# Patient Record
Sex: FEMALE | Race: WHITE | NOT HISPANIC OR LATINO | URBAN - METROPOLITAN AREA
[De-identification: names, ages, dates, MRNs, and addresses within clinical notes are randomized per-mention and may not be internally consistent; named-entity substitution may affect disease eponyms.]

---

## 2020-07-25 ENCOUNTER — TELEPHONE ENCOUNTER (OUTPATIENT)
Dept: URBAN - METROPOLITAN AREA CLINIC 13 | Facility: CLINIC | Age: 70
End: 2020-07-25

## 2020-07-25 RX ORDER — CHOLESTYRAMINE 4 G/9G
DISSOLVE CONTENTS OF ONE PACKET IN WATER OR JUICE AND DRINK DAILY POWDER, FOR SUSPENSION ORAL
Qty: 15 | Refills: 0 | OUTPATIENT
Start: 2012-09-21 | End: 2016-11-08

## 2020-07-25 RX ORDER — ALBUTEROL SULFATE 90 UG/1
INHALE 1 TO 2 PUFFS EVERY 4 TO 6 HOURS AS NEEDED AEROSOL, METERED RESPIRATORY (INHALATION)
Refills: 0 | OUTPATIENT
Start: 2020-02-18 | End: 2020-03-26

## 2020-07-25 RX ORDER — FLUTICASONE FUROATE AND VILANTEROL TRIFENATATE 100; 25 UG/1; UG/1
ONE INHALATION DAILY. AFTER INHALATION RINSE MOUTH WITH WATER & SPIT.USE SAME TIME EACH DAY, NO MORE THAN 1 TIME IN 24 HOURS POWDER RESPIRATORY (INHALATION)
Refills: 0 | OUTPATIENT
Start: 2020-02-18 | End: 2020-03-26

## 2020-07-25 RX ORDER — ACYCLOVIR 400 MG/1
TAKE 1 TABLET 4 TIMES DAILY PRN TABLET ORAL
Refills: 0 | OUTPATIENT
End: 2020-02-21

## 2020-07-25 RX ORDER — FLUCONAZOLE 100 MG/1
TAKE 1 TABLET DAILY TABLET ORAL
Qty: 3 | Refills: 0 | OUTPATIENT
Start: 2012-06-15 | End: 2012-09-04

## 2020-07-25 RX ORDER — HYOSCYAMINE SULFATE 0.12 MG/1
PLACE 1 TABLET UNDER THE TONGUE 4 TIMES DAILY AS NEEDED FOR FREQUENCY OR DIARRHEA TABLET, ORALLY DISINTEGRATING ORAL
Refills: 0 | OUTPATIENT
End: 2016-11-08

## 2020-07-25 RX ORDER — CHOLECALCIFEROL (VITAMIN D3) 50 MCG
TAKE 1 TABLET DAILY PT STATES UNKNOWN DOSE TABLET ORAL
Refills: 0 | OUTPATIENT
End: 2016-11-08

## 2020-07-25 RX ORDER — IBANDRONATE SODIUM 150 MG
TAKE AS DIRECTED TABLET ORAL
Refills: 0 | OUTPATIENT
Start: 2009-12-11 | End: 2010-11-05

## 2020-07-25 RX ORDER — CALCIUM CITRATE/VITAMIN D3 315MG-6.25
TAKE 1 TABLET TWICE DAILY TABLET ORAL
Refills: 0 | OUTPATIENT
Start: 2008-05-21 | End: 2009-12-11

## 2020-07-25 RX ORDER — METRONIDAZOLE 500 MG/1
TAKE 1 CAPSULE 3 TIMES DAILY TABLET ORAL
Qty: 21 | Refills: 0 | OUTPATIENT
Start: 2012-06-15 | End: 2012-09-04

## 2020-07-25 RX ORDER — MONTELUKAST SODIUM 10 MG/1
TAKE 1 TABLET DAILY AS DIRECTED TABLET, FILM COATED ORAL
Refills: 0 | OUTPATIENT
Start: 2020-02-18 | End: 2020-03-26

## 2020-07-25 RX ORDER — METRONIDAZOLE 500 MG/1
TAKE 1 TABLET EVERY 6 HOURS DAILY TABLET, FILM COATED ORAL
Refills: 0 | OUTPATIENT
End: 2012-09-04

## 2020-07-25 RX ORDER — NITAZOXANIDE 500 MG/1
TAKE 1 TABLET TWICE DAILY FOR 14 DAYS TABLET ORAL
Qty: 20 | Refills: 0 | OUTPATIENT
Start: 2012-09-04 | End: 2012-09-14

## 2020-07-25 RX ORDER — IBUPROFEN 400 MG/1
TAKE 1 TABLET 3 TIMES DAILY AS NEEDED TABLET, FILM COATED ORAL
Refills: 0 | OUTPATIENT
End: 2020-02-21

## 2020-07-26 ENCOUNTER — TELEPHONE ENCOUNTER (OUTPATIENT)
Dept: URBAN - METROPOLITAN AREA CLINIC 13 | Facility: CLINIC | Age: 70
End: 2020-07-26

## 2020-07-26 RX ORDER — NAPROXEN 500 MG/1
TAKE ONE TABLET BY MOUTH TWICE DAILY WITH MEALS TABLET ORAL
Qty: 60 | Refills: 0 | Status: ACTIVE | COMMUNITY
Start: 2019-07-31

## 2020-07-26 RX ORDER — BETAMETHASONE DIPROPIONATE 0.5 MG/ML
LOTION, AUGMENTED TOPICAL
Qty: 60 | Refills: 0 | Status: ACTIVE | COMMUNITY
Start: 2011-10-10

## 2020-07-26 RX ORDER — HYDROCODONE BITARTRATE AND ACETAMINOPHEN 5; 325 MG/1; MG/1
TAKE ONE TABLET BY MOUTH EVERY 6 HOURS AS NEEDED TABLET ORAL
Qty: 30 | Refills: 0 | Status: ACTIVE | COMMUNITY
Start: 2019-07-31

## 2020-07-26 RX ORDER — VALACYCLOVIR HYDROCHLORIDE 500 MG/1
TAKE 1 TABLET DAILY TABLET, FILM COATED ORAL
Refills: 0 | Status: ACTIVE | COMMUNITY
Start: 2019-11-20

## 2020-07-26 RX ORDER — HYOSCYAMINE SULFATE 0.12 MG/1
TABLET ORAL
Qty: 20 | Refills: 0 | Status: ACTIVE | COMMUNITY
Start: 2012-08-22

## 2020-07-26 RX ORDER — ACYCLOVIR 200 MG/1
CAPSULE ORAL
Qty: 30 | Refills: 0 | Status: ACTIVE | COMMUNITY
Start: 2011-04-01

## 2020-07-26 RX ORDER — ESCITALOPRAM 10 MG/1
TAKE ONE TABLET BY MOUTH AT BEDTIME TABLET, FILM COATED ORAL
Qty: 30 | Refills: 0 | Status: ACTIVE | COMMUNITY
Start: 2019-05-29

## 2021-10-08 ENCOUNTER — OFFICE VISIT (OUTPATIENT)
Dept: URBAN - METROPOLITAN AREA CLINIC 113 | Facility: CLINIC | Age: 71
End: 2021-10-08
Payer: MEDICARE

## 2021-10-08 ENCOUNTER — WEB ENCOUNTER (OUTPATIENT)
Dept: URBAN - METROPOLITAN AREA CLINIC 113 | Facility: CLINIC | Age: 71
End: 2021-10-08

## 2021-10-08 VITALS
WEIGHT: 128 LBS | HEART RATE: 83 BPM | TEMPERATURE: 97.3 F | HEIGHT: 62 IN | RESPIRATION RATE: 18 BRPM | SYSTOLIC BLOOD PRESSURE: 128 MMHG | BODY MASS INDEX: 23.55 KG/M2 | DIASTOLIC BLOOD PRESSURE: 79 MMHG

## 2021-10-08 DIAGNOSIS — K21.9 GASTROESOPHAGEAL REFLUX DISEASE, UNSPECIFIED WHETHER ESOPHAGITIS PRESENT: ICD-10-CM

## 2021-10-08 DIAGNOSIS — K59.09 CHRONIC CONSTIPATION: ICD-10-CM

## 2021-10-08 DIAGNOSIS — R10.12 LUQ ABDOMINAL PAIN: ICD-10-CM

## 2021-10-08 PROCEDURE — 99204 OFFICE O/P NEW MOD 45 MIN: CPT | Performed by: PHYSICIAN ASSISTANT

## 2021-10-08 RX ORDER — VALACYCLOVIR HYDROCHLORIDE 500 MG/1
TAKE 1 TABLET DAILY TABLET, FILM COATED ORAL
Refills: 0 | Status: ACTIVE | COMMUNITY
Start: 2019-11-20

## 2021-10-08 RX ORDER — ACYCLOVIR 200 MG/1
CAPSULE ORAL
Qty: 30 | Refills: 0 | Status: DISCONTINUED | COMMUNITY
Start: 2011-04-01

## 2021-10-08 RX ORDER — CHOLECALCIFEROL (VITAMIN D3) 50 MCG
1 TABLET TABLET ORAL ONCE A DAY
Status: ACTIVE | COMMUNITY

## 2021-10-08 RX ORDER — ESCITALOPRAM 10 MG/1
TAKE ONE TABLET BY MOUTH AT BEDTIME TABLET, FILM COATED ORAL
Qty: 30 | Refills: 0 | Status: DISCONTINUED | COMMUNITY
Start: 2019-05-29

## 2021-10-08 RX ORDER — ALBUTEROL SULFATE 90 UG/1
1 PUFF AS NEEDED AEROSOL, METERED RESPIRATORY (INHALATION)
Status: ACTIVE | COMMUNITY

## 2021-10-08 RX ORDER — BIOTIN 5 MG
1 CAPSULE CAPSULE ORAL ONCE A DAY
Status: ACTIVE | COMMUNITY

## 2021-10-08 RX ORDER — HYOSCYAMINE SULFATE 0.12 MG/1
TABLET ORAL
Qty: 20 | Refills: 0 | Status: DISCONTINUED | COMMUNITY
Start: 2012-08-22

## 2021-10-08 RX ORDER — NAPROXEN 500 MG/1
TAKE ONE TABLET BY MOUTH TWICE DAILY WITH MEALS TABLET ORAL
Qty: 60 | Refills: 0 | Status: DISCONTINUED | COMMUNITY
Start: 2019-07-31

## 2021-10-08 RX ORDER — ZINC 25 MG
1 TABLET TABLET ORAL ONCE A DAY
Status: ACTIVE | COMMUNITY

## 2021-10-08 RX ORDER — BETAMETHASONE DIPROPIONATE 0.5 MG/ML
LOTION, AUGMENTED TOPICAL
Qty: 60 | Refills: 0 | Status: DISCONTINUED | COMMUNITY
Start: 2011-10-10

## 2021-10-08 RX ORDER — LEVOTHYROXINE SODIUM 100 UG/1
1 TABLET IN THE MORNING ON AN EMPTY STOMACH TABLET ORAL ONCE A DAY
Status: ACTIVE | COMMUNITY

## 2021-10-08 RX ORDER — HYDROCODONE BITARTRATE AND ACETAMINOPHEN 5; 325 MG/1; MG/1
TAKE ONE TABLET BY MOUTH EVERY 6 HOURS AS NEEDED TABLET ORAL
Qty: 30 | Refills: 0 | Status: DISCONTINUED | COMMUNITY
Start: 2019-07-31

## 2021-10-08 NOTE — HPI-TODAY'S VISIT:
Ms. Ryan is a 71-year-old woman with a history of pseudomembranous colitis in 2012 responsive to vancomycin and Questran, hypertension, and hypothyroidism, referred by Dr. Lora, presenting with stated complaints of stomach pain.  A copy of this document will be sent to referring provider.  She was previously followed by Dr. Juan for nuemrous GI complaints including chronic abdominal pain, bloating and intermittent nonbloody diarrhea.  She has been seen by Dr. Lmoeli as well.  Her last colonoscopy performed on 1/3/2017 was notable for diverticulosis in the sigmoid colon, in the descending colon, at the splenic flexure and in the transverse colon.  Nonbleeding internal hemorrhoids.  She is due for surveillance colonoscopy in 2027.  Her last EGD was noted to have been performed on 9/14/2012 which was unremarkable.  She was last seen on 3/26/2020 for follow-up regarding abdominal pain.  Her symptoms were noted to be doing well at that time.  She was instructed to continue MiraLAX daily for exacerbations of constipation.  She was also instructed to avoid dairy products. Her main complaint today appears to the left upper quadrant abdominal discomfort.  She describes the pain as a sharp stabbing pain that occasionally can take her breath away.  Her pain is unrelated to meals or defecation.  She does not take NSAIDs in excess.  There is no associated nausea or vomiting.  She denies any alleviating or exacerbating factors.  She believes it may be related to chronic upper back pain.  She is followed by  for this.  She also complains of  dysphagia described as a sensation of a lump in her throat.  She states that this is a chronic problem.  Denies regurgitation, odynophagia or unintentional weight loss.  Denies early satiety.  Regarding her bowel habits, she states that she is having regular bowel movements that are normal in consitency. No red blood per rectum or melena. She also reports increased fatigue which she noticed after recived her seconda COVID vaccine.  She deneis any changes in her past medical history of surgical hsitory since her last office visit.

## 2021-10-08 NOTE — PHYSICAL EXAM GASTROINTESTINAL
Abdomen,  soft, mild LUQ tenderness with palpation, nondistended,  no guarding or rigidity,  no masses palpable,  normal bowel sounds

## 2021-10-17 ENCOUNTER — TELEPHONE ENCOUNTER (OUTPATIENT)
Dept: URBAN - METROPOLITAN AREA CLINIC 113 | Facility: CLINIC | Age: 71
End: 2021-10-17

## 2021-11-22 ENCOUNTER — OFFICE VISIT (OUTPATIENT)
Dept: URBAN - METROPOLITAN AREA CLINIC 113 | Facility: CLINIC | Age: 71
End: 2021-11-22
Payer: MEDICARE

## 2021-11-22 VITALS
DIASTOLIC BLOOD PRESSURE: 81 MMHG | BODY MASS INDEX: 23 KG/M2 | SYSTOLIC BLOOD PRESSURE: 130 MMHG | HEIGHT: 62 IN | TEMPERATURE: 98.2 F | WEIGHT: 125 LBS | HEART RATE: 85 BPM

## 2021-11-22 DIAGNOSIS — R10.12 LUQ ABDOMINAL PAIN: ICD-10-CM

## 2021-11-22 DIAGNOSIS — K59.09 CHRONIC CONSTIPATION: ICD-10-CM

## 2021-11-22 DIAGNOSIS — K21.9 GASTROESOPHAGEAL REFLUX DISEASE, UNSPECIFIED WHETHER ESOPHAGITIS PRESENT: ICD-10-CM

## 2021-11-22 PROCEDURE — 99214 OFFICE O/P EST MOD 30 MIN: CPT | Performed by: PHYSICIAN ASSISTANT

## 2021-11-22 RX ORDER — LEVOTHYROXINE SODIUM 100 UG/1
1 TABLET IN THE MORNING ON AN EMPTY STOMACH TABLET ORAL ONCE A DAY
Status: ACTIVE | COMMUNITY

## 2021-11-22 RX ORDER — VALACYCLOVIR HYDROCHLORIDE 500 MG/1
TAKE 1 TABLET DAILY TABLET, FILM COATED ORAL
Refills: 0 | Status: ACTIVE | COMMUNITY
Start: 2019-11-20

## 2021-11-22 RX ORDER — MONTELUKAST SODIUM 10 MG/1
1 TABLET TABLET, FILM COATED ORAL ONCE A DAY
Status: ACTIVE | COMMUNITY

## 2021-11-22 NOTE — HPI-TODAY'S VISIT:
Ms. Torres is a 71-year-old woman with a history of pseudomembranous colitis in 2012 responsive to vancomycin  and Questran, hypertension, and hypothyroidism, presenting for follow-up regarding abdominal pain. She was previously followed by Dr. Juan for nuemrous GI complaints including chronic abdominal pain, bloating and intermittent nonbloody diarrhea.  She has been seen by Dr. Lomeli as well.  Her last colonoscopy performed on 1/3/2017 was notable for diverticulosis in the sigmoid colon, in the descending colon, at the splenic flexure and in the transverse colon.  Nonbleeding internal hemorrhoids.  She is due for surveillance colonoscopy in 2027.  Her last EGD was noted to have been performed on 9/14/2012 which was unremarkable.  She was last seen on 3/26/2020 for follow-up regarding abdominal pain.  Her symptoms were noted to be doing well at that time.  She was instructed to continue MiraLAX daily for exacerbations of constipation.  She was also instructed to avoid dairy products. She was last seen on 10/8/2021, referred by Dr. Lora, with complaints of left upper quadrant abdominal pain of uncertain etiology.  A CT abdomen/pelvis was performed to rule out significant intra-abdominal pathology.  Additional laboratory testing was also ordered to rule out biochemical etiologies of her abdominal pain.  She was instructed to resume her Nexium in continue Pepcid 20 mg daily.  An EGD was being considered at that time.  Regarding intermittent constipation, she was recommended to use MiraLAX as needed and Benefiber daily. Labs (10/21/2021): TSH  0.08, FT4 469, folate  1.75.  Vitamin B12 normal.  CMP demonstrated glucose 92, BUN 13, creatinine 0.72, T bili 0.7, ALT 19, AST 20, ALP 93.  Sed rate 8.  CBC demonstrated WBC 10.4, RBC 4.6, hemoglobin 14.5, hematocrit 42.9, MCV 92.8, platelets 241.  Unremarkable CRP. CT abdomen/pelvis with contrast (11/1/2021): Negative study for focal mass, lymphadenopathy, or inflammatory process.  Cholecystectomy, and small splenic cyst or hemangiomas.  Colonic constipation, and aortoiliac vascular calcifications.   She continues to experience left upper abdominal pain and intermittent exacerbations and acid reflux of heartburn symptoms.  She states her abdominal pain is exacerbated with constipation.  There is no nausea or vomiting.  Regarding her bowel habits, she reports alternating bouts of constipation and loose stools.  She states that she feels her "motility is locked up".  She takes stimulant laxatives which produce "sludgelike" stools.  She reports a decreased appetite on account of a significantly amount of increased psychosocial stress.  Because of he increased stress she reports her weight is down to 122.5.  Our records reveal a 3 pound weight loss since her last office visit.  She denies any fevers, chills, nausea or vomiting.  Denies early satiety.  She denies red blood per rectum, melena or hematochezia.  There is no nocturnal diarrhea or fecal urgency.

## 2021-11-22 NOTE — PHYSICAL EXAM GASTROINTESTINAL
Abdomen,  soft, mild left sided abdominal tenderness with palpation, nondistended,  no guarding or rigidity,  no masses palpable,  normal bowel sounds

## 2022-01-05 ENCOUNTER — LAB OUTSIDE AN ENCOUNTER (OUTPATIENT)
Dept: URBAN - METROPOLITAN AREA CLINIC 113 | Facility: CLINIC | Age: 72
End: 2022-01-05

## 2022-01-05 ENCOUNTER — OFFICE VISIT (OUTPATIENT)
Dept: URBAN - METROPOLITAN AREA CLINIC 113 | Facility: CLINIC | Age: 72
End: 2022-01-05
Payer: MEDICARE

## 2022-01-05 VITALS
HEART RATE: 82 BPM | HEIGHT: 62 IN | DIASTOLIC BLOOD PRESSURE: 77 MMHG | SYSTOLIC BLOOD PRESSURE: 134 MMHG | TEMPERATURE: 97.3 F | BODY MASS INDEX: 23.37 KG/M2 | WEIGHT: 127 LBS | RESPIRATION RATE: 18 BRPM

## 2022-01-05 DIAGNOSIS — K21.9 GASTROESOPHAGEAL REFLUX DISEASE, UNSPECIFIED WHETHER ESOPHAGITIS PRESENT: ICD-10-CM

## 2022-01-05 DIAGNOSIS — R11.0 NAUSEA: ICD-10-CM

## 2022-01-05 DIAGNOSIS — R14.2 BELCHING: ICD-10-CM

## 2022-01-05 DIAGNOSIS — R63.4 WEIGHT LOSS: ICD-10-CM

## 2022-01-05 DIAGNOSIS — R10.13 EPIGASTRIC ABDOMINAL PAIN: ICD-10-CM

## 2022-01-05 DIAGNOSIS — K59.09 CHRONIC CONSTIPATION: ICD-10-CM

## 2022-01-05 PROCEDURE — 99214 OFFICE O/P EST MOD 30 MIN: CPT | Performed by: PHYSICIAN ASSISTANT

## 2022-01-05 RX ORDER — LEVOTHYROXINE SODIUM 100 UG/1
1 TABLET IN THE MORNING ON AN EMPTY STOMACH TABLET ORAL ONCE A DAY
Status: ACTIVE | COMMUNITY

## 2022-01-05 RX ORDER — VALACYCLOVIR HYDROCHLORIDE 500 MG/1
TAKE 1 TABLET DAILY TABLET, FILM COATED ORAL
Refills: 0 | Status: ACTIVE | COMMUNITY
Start: 2019-11-20

## 2022-01-05 RX ORDER — PANTOPRAZOLE SODIUM 40 MG/1
1 TABLET TABLET, DELAYED RELEASE ORAL ONCE A DAY
Qty: 90 | Refills: 4 | OUTPATIENT
Start: 2022-01-05

## 2022-01-05 RX ORDER — MONTELUKAST SODIUM 10 MG/1
1 TABLET TABLET, FILM COATED ORAL ONCE A DAY
Status: ACTIVE | COMMUNITY

## 2022-01-05 NOTE — HPI-TODAY'S VISIT:
Ms. Santiago is a 71-year-old woman with a history of pseudomembranous colitis in 2012 responsive to vancomycin and Questran, hypertension, hypothyroidism and chronic GERD, presenting for follow-up regarding abdominal pain. She was last seen on 11/22/2021 with continued complaints of intermittent left-sided abdominal pain with mild heartburn/reflux symptoms.  She had a CT abdomen/pelvis which was demonstrated constipation, otherwise unremarkable.  Her symptoms were thought to be related to suboptimally treated constipation and increased psychosocial stressors.  She was recommended a trial of Linzess 72 mcg as needed.  She was also to continue Benefiber 1 to 2 tablespoons daily for bowel maintenance.  She was instructed to continue Nexium 20 mg once daily and famotidine 20 mg at bedtime for heartburn symptoms.  Abdominal  She returns today with continued complaints of abdominal pain, now located in the epigastric area.  She reports associated night sweats, an 8 pound unintentional weight loss, excessive belching and persistent nausea without vomiting.  She describes her abdominal pain as a "gastritis type pain" she takes Nexium once daily and famotidine at bedtime with no improvement in her symptoms.  She denies fevers, chills, regurgitation or odynophagia.  Regarding her bowel habits, she reports continued "irregular bowels".  She states that Linzess resulted in copious amounts of soft, loose stools.  She has discontinued Linzess.  She takes MiraLAX daily or every other day and is having a soft, formed and nonbloody bowel movement.  No melena or hematochezia.

## 2022-01-05 NOTE — PHYSICAL EXAM GASTROINTESTINAL
Abdomen,  soft, mild left epigastric abdominal tenderness with palpation, nondistended,  no guarding or rigidity,  no masses palpable,  normal bowel sounds

## 2022-01-13 PROBLEM — 301715003: Status: ACTIVE | Noted: 2021-10-17

## 2022-02-01 ENCOUNTER — OFFICE VISIT (OUTPATIENT)
Dept: URBAN - METROPOLITAN AREA SURGERY CENTER 25 | Facility: SURGERY CENTER | Age: 72
End: 2022-02-01

## 2022-03-03 ENCOUNTER — OFFICE VISIT (OUTPATIENT)
Dept: URBAN - METROPOLITAN AREA SURGERY CENTER 25 | Facility: SURGERY CENTER | Age: 72
End: 2022-03-03
Payer: MEDICARE

## 2022-03-03 DIAGNOSIS — R63.4 WEIGHT LOSS: ICD-10-CM

## 2022-03-03 DIAGNOSIS — R10.13 ABDOMINAL PAIN, EPIGASTRIC: ICD-10-CM

## 2022-03-03 PROCEDURE — G8907 PT DOC NO EVENTS ON DISCHARG: HCPCS | Performed by: INTERNAL MEDICINE

## 2022-03-03 PROCEDURE — 43235 EGD DIAGNOSTIC BRUSH WASH: CPT | Performed by: INTERNAL MEDICINE

## 2022-03-03 RX ORDER — MONTELUKAST SODIUM 10 MG/1
1 TABLET TABLET, FILM COATED ORAL ONCE A DAY
Status: ACTIVE | COMMUNITY

## 2022-03-03 RX ORDER — PANTOPRAZOLE SODIUM 40 MG/1
1 TABLET TABLET, DELAYED RELEASE ORAL ONCE A DAY
Qty: 90 | Refills: 4 | Status: ACTIVE | COMMUNITY
Start: 2022-01-05

## 2022-03-03 RX ORDER — LEVOTHYROXINE SODIUM 100 UG/1
1 TABLET IN THE MORNING ON AN EMPTY STOMACH TABLET ORAL ONCE A DAY
Status: ACTIVE | COMMUNITY

## 2022-03-03 RX ORDER — VALACYCLOVIR HYDROCHLORIDE 500 MG/1
TAKE 1 TABLET DAILY TABLET, FILM COATED ORAL
Refills: 0 | Status: ACTIVE | COMMUNITY
Start: 2019-11-20

## 2022-03-25 ENCOUNTER — OFFICE VISIT (OUTPATIENT)
Dept: URBAN - METROPOLITAN AREA CLINIC 113 | Facility: CLINIC | Age: 72
End: 2022-03-25

## 2022-04-13 ENCOUNTER — OFFICE VISIT (OUTPATIENT)
Dept: URBAN - METROPOLITAN AREA CLINIC 113 | Facility: CLINIC | Age: 72
End: 2022-04-13
Payer: MEDICARE

## 2022-04-13 VITALS
DIASTOLIC BLOOD PRESSURE: 73 MMHG | RESPIRATION RATE: 20 BRPM | HEART RATE: 87 BPM | BODY MASS INDEX: 23.19 KG/M2 | SYSTOLIC BLOOD PRESSURE: 117 MMHG | WEIGHT: 126 LBS | HEIGHT: 62 IN | TEMPERATURE: 98.2 F

## 2022-04-13 DIAGNOSIS — R11.0 NAUSEA: ICD-10-CM

## 2022-04-13 DIAGNOSIS — K59.09 CHRONIC CONSTIPATION: ICD-10-CM

## 2022-04-13 DIAGNOSIS — K21.9 GASTROESOPHAGEAL REFLUX DISEASE, UNSPECIFIED WHETHER ESOPHAGITIS PRESENT: ICD-10-CM

## 2022-04-13 DIAGNOSIS — R63.4 WEIGHT LOSS: ICD-10-CM

## 2022-04-13 DIAGNOSIS — R10.13 EPIGASTRIC ABDOMINAL PAIN: ICD-10-CM

## 2022-04-13 DIAGNOSIS — R14.2 BELCHING: ICD-10-CM

## 2022-04-13 PROCEDURE — 99213 OFFICE O/P EST LOW 20 MIN: CPT | Performed by: PHYSICIAN ASSISTANT

## 2022-04-13 RX ORDER — MONTELUKAST SODIUM 10 MG/1
1 TABLET TABLET, FILM COATED ORAL ONCE A DAY
Status: ACTIVE | COMMUNITY

## 2022-04-13 RX ORDER — LEVOTHYROXINE SODIUM 100 UG/1
1 TABLET IN THE MORNING ON AN EMPTY STOMACH TABLET ORAL ONCE A DAY
Status: ACTIVE | COMMUNITY

## 2022-04-13 RX ORDER — VALACYCLOVIR HYDROCHLORIDE 500 MG/1
TAKE 1 TABLET DAILY TABLET, FILM COATED ORAL
Refills: 0 | Status: ACTIVE | COMMUNITY
Start: 2019-11-20

## 2022-04-13 RX ORDER — SERTRALINE 25 MG/1
1-1/2 TABLETS TABLET, FILM COATED ORAL ONCE A DAY
Status: ACTIVE | COMMUNITY

## 2022-04-13 RX ORDER — FLUTICASONE FUROATE AND VILANTEROL TRIFENATATE 100; 25 UG/1; UG/1
1 PUFF POWDER RESPIRATORY (INHALATION) ONCE A DAY
Status: ACTIVE | COMMUNITY

## 2022-04-13 RX ORDER — PANTOPRAZOLE SODIUM 40 MG/1
1 TABLET TABLET, DELAYED RELEASE ORAL ONCE A DAY
Qty: 90 | Refills: 4 | Status: ON HOLD | COMMUNITY
Start: 2022-01-05

## 2022-04-13 NOTE — HPI-TODAY'S VISIT:
Ms. Santiago is a 71-year-old woman with a history of pseudomembranous colitis in 2012 was positive vancomycin and Questran, hypertension, hypothyroidism and chronic GERD, presenting for follow-up after undergoing EGD. She was last seen on 1/5/2022 for follow-up regarding complaints of epigastric abdominal pain, refractory GERD symptoms, nausea, excessive belching and unintentional weight loss.  An EGD was performed to rule out significant gastroesophageal pathology.  She was started on pantoprazole 40 mg once daily.  Her chronic constipation was well managed on daily fiber supplement and MiraLAX. EGD (3/3/2022): Normal examined duodenum.  Normal stomach.  If epigastric is in the upper third of the esophagus.  No specimens collected.  She states that she feels well today. Her bowel habits are doing well with dietary modifications. She did not trial Linzess as she "tries not to take many medications". She uses MiraLAX as needed for constipation. Her belching and GERD symptoms have also done well with and dietary modifications. She tells me that she does not require  There is no difficulty swallowing, regurgitation or odynophagia. Her weight remains stable. She feels as if a majority of her symptoms improved with starting sertraline for anxiety.  Of note, she remains under a significant amount of stress, stating that her son is "facing shelter time".

## 2022-04-22 ENCOUNTER — OFFICE VISIT (OUTPATIENT)
Dept: URBAN - METROPOLITAN AREA CLINIC 113 | Facility: CLINIC | Age: 72
End: 2022-04-22

## 2022-04-23 PROBLEM — 235595009: Status: ACTIVE | Noted: 2021-10-17

## 2022-04-23 PROBLEM — 89362005: Status: ACTIVE | Noted: 2022-01-17

## 2022-04-23 PROBLEM — 422587007: Status: ACTIVE | Noted: 2022-01-17

## 2022-04-23 PROBLEM — 236069009: Status: ACTIVE | Noted: 2021-10-17

## 2022-04-23 PROBLEM — 271834000: Status: ACTIVE | Noted: 2022-01-17

## 2022-04-23 PROBLEM — 79922009: Status: ACTIVE | Noted: 2022-01-17

## 2023-03-28 ENCOUNTER — WEB ENCOUNTER (OUTPATIENT)
Dept: URBAN - METROPOLITAN AREA CLINIC 107 | Facility: CLINIC | Age: 73
End: 2023-03-28

## 2023-03-31 ENCOUNTER — OFFICE VISIT (OUTPATIENT)
Dept: URBAN - METROPOLITAN AREA CLINIC 107 | Facility: CLINIC | Age: 73
End: 2023-03-31
Payer: MEDICARE

## 2023-03-31 VITALS
HEART RATE: 93 BPM | SYSTOLIC BLOOD PRESSURE: 115 MMHG | WEIGHT: 119 LBS | BODY MASS INDEX: 21.9 KG/M2 | DIASTOLIC BLOOD PRESSURE: 71 MMHG | HEIGHT: 62 IN | TEMPERATURE: 98.1 F

## 2023-03-31 DIAGNOSIS — R10.31 RLQ ABDOMINAL PAIN: ICD-10-CM

## 2023-03-31 DIAGNOSIS — K21.9 GASTROESOPHAGEAL REFLUX DISEASE, UNSPECIFIED WHETHER ESOPHAGITIS PRESENT: ICD-10-CM

## 2023-03-31 DIAGNOSIS — Z79.1 NSAID LONG-TERM USE: ICD-10-CM

## 2023-03-31 DIAGNOSIS — R10.13 EPIGASTRIC ABDOMINAL PAIN: ICD-10-CM

## 2023-03-31 DIAGNOSIS — R10.11 RUQ PAIN: ICD-10-CM

## 2023-03-31 DIAGNOSIS — K59.09 CHRONIC CONSTIPATION: ICD-10-CM

## 2023-03-31 DIAGNOSIS — R14.2 BELCHING: ICD-10-CM

## 2023-03-31 PROCEDURE — 99214 OFFICE O/P EST MOD 30 MIN: CPT

## 2023-03-31 RX ORDER — LEVOTHYROXINE SODIUM 100 UG/1
1 TABLET IN THE MORNING ON AN EMPTY STOMACH TABLET ORAL ONCE A DAY
Status: ACTIVE | COMMUNITY

## 2023-03-31 RX ORDER — FLUTICASONE FUROATE AND VILANTEROL TRIFENATATE 100; 25 UG/1; UG/1
1 PUFF POWDER RESPIRATORY (INHALATION) ONCE A DAY
Status: ON HOLD | COMMUNITY

## 2023-03-31 RX ORDER — MONTELUKAST SODIUM 10 MG/1
1 TABLET TABLET, FILM COATED ORAL ONCE A DAY
Status: ON HOLD | COMMUNITY

## 2023-03-31 RX ORDER — SERTRALINE 25 MG/1
1-1/2 TABLETS TABLET, FILM COATED ORAL ONCE A DAY
Status: ON HOLD | COMMUNITY

## 2023-03-31 RX ORDER — VALACYCLOVIR HYDROCHLORIDE 500 MG/1
TAKE 1 TABLET DAILY TABLET, FILM COATED ORAL
Refills: 0 | Status: ACTIVE | COMMUNITY
Start: 2019-11-20

## 2023-03-31 RX ORDER — PANTOPRAZOLE SODIUM 40 MG/1
1 TABLET TABLET, DELAYED RELEASE ORAL ONCE A DAY
Qty: 90 | Refills: 4 | Status: ON HOLD | COMMUNITY
Start: 2022-01-05

## 2023-03-31 NOTE — HPI-OTHER HISTORIES
EGD (3/3/2022): Normal examined duodenum.  Normal stomach.  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  No specimens collected.

## 2023-03-31 NOTE — HPI-TODAY'S VISIT:
72-year-old female with a history of pseudomembranous colitis in 2012 was positive vancomycin and Questran, hypertension, hypothyroidism and chronic GERD presents for evaluation of esophageal pain and right sided abdominal pain.   She was last seen on 4/13/2022.  Her upper GI symptoms were suspected to be functional dyspepsia exacerbated by psychosocial stress.  Her symptoms had largely improved with initiation of an SSRI and dietary modifications.  She was to resume daily PPI therapy should her symptoms recur.  Chronic constipation was well managed on MiraLAX every other day.  SHe was diagnosed with pneumonia in early March. Her flu, covid and RSV were negative. She was though to have bacterial PNA. She completed a week long course of Doxycycline. SHe presents with recurrent epigastric pain that radiates up her chest. She feels this also radiates to her back. This does not worsen with food. She saw her PCP after her hospitalization for these symptoms. She was started on Pantoprazole 40 mg once daily. She took one tablet then lie back in a recliner. She then had severe epigastric pain that radiated down her right arm. She was "expecting to cough blood" because her pain was so bad. The pain has lessened though still present. She describes it now as epigastric burning and tenderness. This feels like a spasm when she belches. She does take NSAIDs intermittently. She does also have RUQ tenderness ongoing for years. She does have hemangiomas of the liver. She has had a cholecystectomy. She is having night sweats.   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 Her pulmonologist is Dr. Webb. She was also told she has mild asthma at some point but urgent care attributes her cough to GERD. She takes Senna as needed for constipation. She has been requiring this more frequently. Her last colonoscopy was in 2017 for change in bowel habits. Random biopsies were negative. She is due in 2027 for repeat screening.

## 2023-04-01 LAB
A/G RATIO: 1.7
ABSOLUTE BASOPHILS: 64
ABSOLUTE EOSINOPHILS: 383
ABSOLUTE LYMPHOCYTES: 2357
ABSOLUTE MONOCYTES: 703
ABSOLUTE NEUTROPHILS: 3593
ALBUMIN: 4.3
ALKALINE PHOSPHATASE: 90
ALT (SGPT): 14
AST (SGOT): 19
BASOPHILS: 0.9
BILIRUBIN, TOTAL: 0.5
BUN/CREATININE RATIO: (no result)
BUN: 9
CALCIUM: 9.4
CARBON DIOXIDE, TOTAL: 27
CHLORIDE: 105
CREATININE: 0.63
EGFR: 95
EOSINOPHILS: 5.4
GLOBULIN, TOTAL: 2.5
GLUCOSE: 82
HEMATOCRIT: 40.4
HEMOGLOBIN: 13.7
LIPASE: 48
LYMPHOCYTES: 33.2
MCH: 30.2
MCHC: 33.9
MCV: 89
MONOCYTES: 9.9
MPV: 11.2
NEUTROPHILS: 50.6
PLATELET COUNT: 200
POTASSIUM: 4.2
PROTEIN, TOTAL: 6.8
RDW: 12.3
RED BLOOD CELL COUNT: 4.54
SODIUM: 142
WHITE BLOOD CELL COUNT: 7.1

## 2023-04-20 PROBLEM — 301717006: Status: ACTIVE | Noted: 2023-04-20

## 2023-04-20 PROBLEM — 305058001: Status: ACTIVE | Noted: 2023-04-20

## 2023-05-10 ENCOUNTER — OFFICE VISIT (OUTPATIENT)
Dept: URBAN - METROPOLITAN AREA SURGERY CENTER 25 | Facility: SURGERY CENTER | Age: 73
End: 2023-05-10

## 2023-05-10 ENCOUNTER — CLAIMS CREATED FROM THE CLAIM WINDOW (OUTPATIENT)
Dept: URBAN - METROPOLITAN AREA SURGERY CENTER 25 | Facility: SURGERY CENTER | Age: 73
End: 2023-05-10
Payer: MEDICARE

## 2023-05-10 ENCOUNTER — CLAIMS CREATED FROM THE CLAIM WINDOW (OUTPATIENT)
Dept: URBAN - METROPOLITAN AREA CLINIC 4 | Facility: CLINIC | Age: 73
End: 2023-05-10
Payer: MEDICARE

## 2023-05-10 DIAGNOSIS — K25.9 GASTRIC EROSION: ICD-10-CM

## 2023-05-10 DIAGNOSIS — R10.13 ABDOMINAL PAIN, EPIGASTRIC: ICD-10-CM

## 2023-05-10 DIAGNOSIS — K31.89 OTHER DISEASES OF STOMACH AND DUODENUM: ICD-10-CM

## 2023-05-10 DIAGNOSIS — K29.60 OTHER GASTRITIS WITHOUT BLEEDING: ICD-10-CM

## 2023-05-10 DIAGNOSIS — K29.70 GASTRITIS, UNSPECIFIED, WITHOUT BLEEDING: ICD-10-CM

## 2023-05-10 PROCEDURE — 88305 TISSUE EXAM BY PATHOLOGIST: CPT | Performed by: PATHOLOGY

## 2023-05-10 PROCEDURE — G8907 PT DOC NO EVENTS ON DISCHARG: HCPCS | Performed by: INTERNAL MEDICINE

## 2023-05-10 PROCEDURE — 43239 EGD BIOPSY SINGLE/MULTIPLE: CPT | Performed by: INTERNAL MEDICINE

## 2023-05-10 PROCEDURE — 88342 IMHCHEM/IMCYTCHM 1ST ANTB: CPT | Performed by: PATHOLOGY

## 2023-05-10 RX ORDER — FLUTICASONE FUROATE AND VILANTEROL TRIFENATATE 100; 25 UG/1; UG/1
1 PUFF POWDER RESPIRATORY (INHALATION) ONCE A DAY
Status: ON HOLD | COMMUNITY

## 2023-05-10 RX ORDER — LEVOTHYROXINE SODIUM 100 UG/1
1 TABLET IN THE MORNING ON AN EMPTY STOMACH TABLET ORAL ONCE A DAY
Status: ACTIVE | COMMUNITY

## 2023-05-10 RX ORDER — SERTRALINE 25 MG/1
1-1/2 TABLETS TABLET, FILM COATED ORAL ONCE A DAY
Status: ON HOLD | COMMUNITY

## 2023-05-10 RX ORDER — VALACYCLOVIR HYDROCHLORIDE 500 MG/1
TAKE 1 TABLET DAILY TABLET, FILM COATED ORAL
Refills: 0 | Status: ACTIVE | COMMUNITY
Start: 2019-11-20

## 2023-05-10 RX ORDER — MONTELUKAST SODIUM 10 MG/1
1 TABLET TABLET, FILM COATED ORAL ONCE A DAY
Status: ON HOLD | COMMUNITY

## 2023-05-10 RX ORDER — PANTOPRAZOLE SODIUM 40 MG/1
1 TABLET TABLET, DELAYED RELEASE ORAL ONCE A DAY
Qty: 90 | Refills: 4 | Status: ON HOLD | COMMUNITY
Start: 2022-01-05

## 2023-06-21 ENCOUNTER — OFFICE VISIT (OUTPATIENT)
Dept: URBAN - METROPOLITAN AREA SURGERY CENTER 25 | Facility: SURGERY CENTER | Age: 73
End: 2023-06-21

## 2023-06-29 ENCOUNTER — OFFICE VISIT (OUTPATIENT)
Dept: URBAN - METROPOLITAN AREA CLINIC 113 | Facility: CLINIC | Age: 73
End: 2023-06-29
Payer: MEDICARE

## 2023-06-29 ENCOUNTER — DASHBOARD ENCOUNTERS (OUTPATIENT)
Age: 73
End: 2023-06-29

## 2023-06-29 VITALS
WEIGHT: 126 LBS | RESPIRATION RATE: 20 BRPM | SYSTOLIC BLOOD PRESSURE: 129 MMHG | TEMPERATURE: 98.2 F | DIASTOLIC BLOOD PRESSURE: 74 MMHG | HEIGHT: 62 IN | BODY MASS INDEX: 23.19 KG/M2 | HEART RATE: 82 BPM

## 2023-06-29 DIAGNOSIS — R10.13 EPIGASTRIC ABDOMINAL PAIN: ICD-10-CM

## 2023-06-29 DIAGNOSIS — K59.09 CHRONIC CONSTIPATION: ICD-10-CM

## 2023-06-29 DIAGNOSIS — K21.9 GASTROESOPHAGEAL REFLUX DISEASE, UNSPECIFIED WHETHER ESOPHAGITIS PRESENT: ICD-10-CM

## 2023-06-29 DIAGNOSIS — R14.2 BELCHING: ICD-10-CM

## 2023-06-29 DIAGNOSIS — Z79.1 NSAID LONG-TERM USE: ICD-10-CM

## 2023-06-29 DIAGNOSIS — R10.31 RLQ ABDOMINAL PAIN: ICD-10-CM

## 2023-06-29 DIAGNOSIS — R10.11 RUQ PAIN: ICD-10-CM

## 2023-06-29 PROCEDURE — 99214 OFFICE O/P EST MOD 30 MIN: CPT

## 2023-06-29 RX ORDER — LEVOTHYROXINE SODIUM 100 UG/1
1 TABLET IN THE MORNING ON AN EMPTY STOMACH TABLET ORAL ONCE A DAY
Status: ACTIVE | COMMUNITY

## 2023-06-29 RX ORDER — SERTRALINE 25 MG/1
1-1/2 TABLETS TABLET, FILM COATED ORAL ONCE A DAY
Status: ON HOLD | COMMUNITY

## 2023-06-29 RX ORDER — VALACYCLOVIR HYDROCHLORIDE 500 MG/1
TAKE 1 TABLET DAILY TABLET, FILM COATED ORAL
Refills: 0 | Status: ACTIVE | COMMUNITY
Start: 2019-11-20

## 2023-06-29 RX ORDER — AMITRIPTYLINE HYDROCHLORIDE 10 MG/1
1 TABLET AT BEDTIME TABLET, FILM COATED ORAL ONCE A DAY
Qty: 90 | Refills: 2 | OUTPATIENT
Start: 2023-06-29

## 2023-06-29 RX ORDER — PANTOPRAZOLE SODIUM 40 MG/1
1 TABLET TABLET, DELAYED RELEASE ORAL ONCE A DAY
Qty: 90 | Refills: 4 | Status: ACTIVE | COMMUNITY
Start: 2022-01-05

## 2023-06-29 RX ORDER — FLUTICASONE FUROATE AND VILANTEROL TRIFENATATE 100; 25 UG/1; UG/1
1 PUFF POWDER RESPIRATORY (INHALATION) ONCE A DAY
Status: ON HOLD | COMMUNITY

## 2023-06-29 RX ORDER — MONTELUKAST SODIUM 10 MG/1
1 TABLET TABLET, FILM COATED ORAL ONCE A DAY
Status: ON HOLD | COMMUNITY

## 2023-06-29 NOTE — HPI-TODAY'S VISIT:
72-year-old female with a history of pseudomembranous colitis in  was positive vancomycin and Questran, hypertension, hypothyroidism and chronic GERD presents for evaluation of esophageal pain and right sided abdominal pain.   She was last seen on 2022.  Her upper GI symptoms were suspected to be functional dyspepsia exacerbated by psychosocial stress.  Her symptoms had largely improved with initiation of an SSRI and dietary modifications.  She was to resume daily PPI therapy should her symptoms recur.  Chronic constipation was well managed on MiraLAX every other day.  SHe was diagnosed with pneumonia in early March. Her flu, covid and RSV were negative. She was though to have bacterial PNA. She completed a week long course of Doxycycline. SHe presents with recurrent epigastric pain that radiates up her chest. She feels this also radiates to her back. This does not worsen with food. She saw her PCP after her hospitalization for these symptoms. She was started on Pantoprazole 40 mg once daily. She took one tablet then lie back in a recliner. She then had severe epigastric pain that radiated down her right arm. She was "expecting to cough blood" because her pain was so bad. The pain has lessened though still present. She describes it now as epigastric burning and tenderness. This feels like a spasm when she belches. She does take NSAIDs intermittently. She does also have RUQ tenderness ongoing for years. She does have hemangiomas of the liver. She has had a cholecystectomy. She is having night sweats.   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 Her pulmonologist is Dr. Webb. She was also told she has mild asthma at some point but urgent care attributes her cough to GERD. She takes Senna as needed for constipation. She has been requiring this more frequently. Her last colonoscopy was in  for change in bowel habits. Random biopsies were negative. She is due in  for repeat screening.  Interval history, 2023: 72-year-old female presents for follow-up after EGD.  She was last seen on 3/31/2023.  Given her NSAID use severe epigastric pain she was planned for EGD to assess for peptic ulcer disease.  She was also planned for labs and CT imaging to rule out pancreatic pathology.  She declined PPI therapy as pantoprazole seem to worsen her symptoms. Labs 3/31/2023:Normal lipase, normal CMP, normal CBC. EGD 5/10/2023:Performed without difficulty.  Duodenum was normal.  Localized moderate mucosal changes characterized by erythema and erosion found in greater curvature of the gastric body status post biopsies.  Pathology revealed nonspecific chronic gastritis and PPI effect, negative for H. pylori or intestinal metaplasia.  There was a 3 cm area of irritation questionable for medication effect.  Small hiatal hernia was found.  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 We do not have updated CT scan for review. CT scan of the abdomen/pelvis without contrast 2023:Negative study for focal mass, lymphadenopathy or inflammatory process.  Gallbladder is absent.  Liver, spleen, pancreas and adrenal glands and kidneys are normal.  Mild colonic constipation and small bowel stasis pattern.  There is mild thickening of the stomach suggesting gastritis.  Vascular calcifications.  Sigmoid diverticulosis without diverticulitis. Labs 2023:Normal iron panel, normal serum ferritin, normal CBC, normal T4, normal T3, low TSH 0.17.  She has multiple complaints today. She continues to have right sided abdominal pain that typically focalizes in the RLQ. She is very concerned. She proceeds to palpate the area and states she feels a bulge or a lymph node. She states her pain drastically worsened a week ago and had radiated to her lower back  and flank. She has no urinary symptoms. These referred symptoms have resolved. She tried switching to Metamucil which caused worsening constipation. She was instructed by Dr. Tracy to avoid Senna as can cause irritation however she feels this is the only thing that works for her. Denies worsening abdomen pain with food. No weight loss. No blood per rectum.   She remains very concerned with her symptoms as her mother  tragically from a PE after ongoing surgery for gangrenous bowel. Her friend was also diagnosed with ovarian cancer and had similar symptoms. She has a pap smear once per year. She states Zoloft caused dizziness in the past.

## 2023-07-11 ENCOUNTER — TELEPHONE ENCOUNTER (OUTPATIENT)
Dept: URBAN - METROPOLITAN AREA CLINIC 113 | Facility: CLINIC | Age: 73
End: 2023-07-11

## 2023-07-11 PROBLEM — 16846341000119101: Status: ACTIVE | Noted: 2023-07-11

## 2023-08-30 ENCOUNTER — OFFICE VISIT (OUTPATIENT)
Dept: URBAN - METROPOLITAN AREA CLINIC 113 | Facility: CLINIC | Age: 73
End: 2023-08-30

## 2023-08-30 NOTE — HPI-TODAY'S VISIT:
72-year-old female with a history of pseudomembranous colitis in  was positive vancomycin and Questran, hypertension, hypothyroidism and chronic GERD presents for evaluation of esophageal pain and right sided abdominal pain.   She was last seen on 2022.  Her upper GI symptoms were suspected to be functional dyspepsia exacerbated by psychosocial stress.  Her symptoms had largely improved with initiation of an SSRI and dietary modifications.  She was to resume daily PPI therapy should her symptoms recur.  Chronic constipation was well managed on MiraLAX every other day.  SHe was diagnosed with pneumonia in early March. Her flu, covid and RSV were negative. She was though to have bacterial PNA. She completed a week long course of Doxycycline. SHe presents with recurrent epigastric pain that radiates up her chest. She feels this also radiates to her back. This does not worsen with food. She saw her PCP after her hospitalization for these symptoms. She was started on Pantoprazole 40 mg once daily. She took one tablet then lie back in a recliner. She then had severe epigastric pain that radiated down her right arm. She was "expecting to cough blood" because her pain was so bad. The pain has lessened though still present. She describes it now as epigastric burning and tenderness. This feels like a spasm when she belches. She does take NSAIDs intermittently. She does also have RUQ tenderness ongoing for years. She does have hemangiomas of the liver. She has had a cholecystectomy. She is having night sweats.   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 Her pulmonologist is Dr. Webb. She was also told she has mild asthma at some point but urgent care attributes her cough to GERD. She takes Senna as needed for constipation. She has been requiring this more frequently. Her last colonoscopy was in  for change in bowel habits. Random biopsies were negative. She is due in  for repeat screening.  Interval history, 2023: 72-year-old female presents for follow-up after EGD.  She was last seen on 3/31/2023.  Given her NSAID use severe epigastric pain she was planned for EGD to assess for peptic ulcer disease.  She was also planned for labs and CT imaging to rule out pancreatic pathology.  She declined PPI therapy as pantoprazole seem to worsen her symptoms. Labs 3/31/2023:Normal lipase, normal CMP, normal CBC. EGD 5/10/2023:Performed without difficulty.  Duodenum was normal.  Localized moderate mucosal changes characterized by erythema and erosion found in greater curvature of the gastric body status post biopsies.  Pathology revealed nonspecific chronic gastritis and PPI effect, negative for H. pylori or intestinal metaplasia.  There was a 3 cm area of irritation questionable for medication effect.  Small hiatal hernia was found.  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 We do not have updated CT scan for review. CT scan of the abdomen/pelvis without contrast 2023:Negative study for focal mass, lymphadenopathy or inflammatory process.  Gallbladder is absent.  Liver, spleen, pancreas and adrenal glands and kidneys are normal.  Mild colonic constipation and small bowel stasis pattern.  There is mild thickening of the stomach suggesting gastritis.  Vascular calcifications.  Sigmoid diverticulosis without diverticulitis. Labs 2023:Normal iron panel, normal serum ferritin, normal CBC, normal T4, normal T3, low TSH 0.17.  She has multiple complaints today. She continues to have right sided abdominal pain that typically focalizes in the RLQ. She is very concerned. She proceeds to palpate the area and states she feels a bulge or a lymph node. She states her pain drastically worsened a week ago and had radiated to her lower back  and flank. She has no urinary symptoms. These referred symptoms have resolved. She tried switching to Metamucil which caused worsening constipation. She was instructed by Dr. Tracy to avoid Senna as can cause irritation however she feels this is the only thing that works for her. Denies worsening abdomen pain with food. No weight loss. No blood per rectum.   She remains very concerned with her symptoms as her mother  tragically from a PE after ongoing surgery for gangrenous bowel. Her friend was also diagnosed with ovarian cancer and had similar symptoms. She has a pap smear once per year. She states Zoloft caused dizziness in the past. Interval history, 2023: 72-year-old female presents for follow-up.  She was last seen on 2023.  Her symptoms had resolved after restarting pantoprazole.  Regarding her abdominal pain she was started on amitriptyline 10 mg at bedtime  And plan for Doppler ultrasound of her mesenteric vessels. 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 Interval history, 2023: She was referred to Dr. Leiva per her request.